# Patient Record
(demographics unavailable — no encounter records)

---

## 2025-03-19 NOTE — CARDIOLOGY SUMMARY
[TextEntry] : EK18, normal   Stress Test: 2011, Normal myocardial perfusion.   Echo: 17, normal cardiac chamber sizes. Mild TR. Minimal MR. Mild LV diastolic dysfunction. normal left and right ventricular systolic function. LVEF 55-60%.   Cardiac Cath: , Occluded SVG Lcx and RCa. LIMA to LAD patent. Collaterals to RCA trritory. Native Lcx with patent stent. LVEF 50%.   Stent: ,  coronary stent.  PTCA Lcx.    -   CAB, LIMA LAD. SVG Lcx and RCA.   Carotid doppler 17 - Minimal bilateral plaque ( 1-15%).   EKG 3/19/25: SB

## 2025-03-19 NOTE — HISTORY OF PRESENT ILLNESS
[FreeTextEntry1] : Mino is a 88yoM PMH CAD s/p CABG and PTCA to LCx, DM, HLD who presents for initial consultation.   Had been on ACEI in the past but had SHEEBA and hyperK from this Lipitor decreased to 20mg due to elevated LFTs in the past  He has been having exertional chest pain, it goes up to his gums It occurs with walking or with sitting  This has been going on for years States he had a stress test 4 years ago but does not know the result and does not know the name of the Cardiologist He does not want another stress test   He presents today to have a new Cardiologist

## 2025-03-19 NOTE — DISCUSSION/SUMMARY
[EKG obtained to assist in diagnosis and management of assessed problem(s)] : EKG obtained to assist in diagnosis and management of assessed problem(s) [FreeTextEntry1] : Mino is a 88yoM PMH CAD s/p CABG and PTCA to LCx, DM, HLD who presents for initial consultation.   He had previously seen a Cardiologist back in 2018. He states he has since someone since then and had a stress test but does not recall the name or the results. He has had persistent chest discomfort for years that occurs when he is walking or with sitting or when he is tired. He does not want to proceed with any stress testing at this time.  A TTE will be done. He already remains on Coreg 12.5mg BID. I will initiate Ranolazine 500mg BID as well to see if this helps with his symptoms.  He remains on ASA 81mg and Lipitor 40mg daily. I will obtain a copy of his most recent blood work.   To follow up closely in 3 months for symptom re-evaluation.

## 2025-04-11 NOTE — HISTORY OF PRESENT ILLNESS
[FreeTextEntry1] : Patient is a 88 year old RH male here for evaluation of left hand tremor and numbness in the feet  1. He states since he was young he had minor tremor in both hand, but 2021 he started noticing the left hand tremor. the tremor is present at rest, he does not notice the tremor with action. Tremor unchanged since last visit.  2. Started rasagiline, stopped it again (caused pedal edema). No other PD med 3. No dysphagia. ADLs independent. No falls, balance is okay 4. Memory ok, no depression or anxiety (lost two of kids to cancer). no hallucinations. Sleep good on 2 tylenol PM (for diabetic foot pain). No constipation

## 2025-04-11 NOTE — DISCUSSION/SUMMARY
[FreeTextEntry1] : Patient is an 88 year old RH male here for evaluation of tremor on the left side. Patient states he has long standing history of action tremor in both hands since he was young, but developed left hand rest tremor about 2021 years back. History and exam is most consistent with idiopathic Parkinson's ds. No red flags for atypical features. In addition, he does seem to have long standing essential tremor in both hands.   He is bothered by PD tremor more than ET tremor, will try LD.   We discussed the above impression, plan and recommendations during the visit. Counseling represented more then 50% of the 30 minute visit time.

## 2025-04-11 NOTE — PHYSICAL EXAM
[Person] : oriented to person [Place] : oriented to place [Time] : oriented to time [Cranial Nerves Optic (II)] : visual acuity intact bilaterally,  visual fields full to confrontation, pupils equal round and reactive to light [Cranial Nerves Oculomotor (III)] : extraocular motion intact [Cranial Nerves Trigeminal (V)] : facial sensation intact symmetrically [Cranial Nerves Facial (VII)] : face symmetrical [Cranial Nerves Vestibulocochlear (VIII)] : hearing was intact bilaterally [Cranial Nerves Glossopharyngeal (IX)] : tongue and palate midline [Cranial Nerves Accessory (XI - Cranial And Spinal)] : head turning and shoulder shrug symmetric [Cranial Nerves Hypoglossal (XII)] : there was no tongue deviation with protrusion [Motor Tone] : muscle tone was normal in all four extremities [Motor Strength] : muscle strength was normal in all four extremities [Motor Handedness Right-Handed] : the patient is right hand dominant [FreeTextEntry1] : reduced expression, reduced blink rate EOMI, no SWJ  grade2 bradykinesia on the left hand, grade 1 on right grade 2 rigidity on the left, grade1 rigidity on the right Foot tap ok  Able to get up from chair without arms. Gait slow, shortened stride. Pull test negative  Left hand rest tremor and left>right kinetic tremor